# Patient Record
Sex: MALE | Race: WHITE | NOT HISPANIC OR LATINO
[De-identification: names, ages, dates, MRNs, and addresses within clinical notes are randomized per-mention and may not be internally consistent; named-entity substitution may affect disease eponyms.]

---

## 2017-01-05 ENCOUNTER — APPOINTMENT (OUTPATIENT)
Dept: GASTROENTEROLOGY | Facility: CLINIC | Age: 82
End: 2017-01-05

## 2017-01-05 VITALS
TEMPERATURE: 98.1 F | RESPIRATION RATE: 16 BRPM | DIASTOLIC BLOOD PRESSURE: 80 MMHG | HEART RATE: 73 BPM | WEIGHT: 156 LBS | BODY MASS INDEX: 24.48 KG/M2 | OXYGEN SATURATION: 95 % | HEIGHT: 67 IN | SYSTOLIC BLOOD PRESSURE: 138 MMHG

## 2017-01-05 DIAGNOSIS — Z86.39 PERSONAL HISTORY OF OTHER ENDOCRINE, NUTRITIONAL AND METABOLIC DISEASE: ICD-10-CM

## 2017-01-05 DIAGNOSIS — Z86.79 PERSONAL HISTORY OF OTHER DISEASES OF THE CIRCULATORY SYSTEM: ICD-10-CM

## 2017-01-08 PROBLEM — Z86.79 HISTORY OF HYPERTENSION: Status: RESOLVED | Noted: 2017-01-05 | Resolved: 2017-01-08

## 2017-01-08 PROBLEM — Z86.39 HISTORY OF HYPERCHOLESTEROLEMIA: Status: RESOLVED | Noted: 2017-01-05 | Resolved: 2017-01-08

## 2017-01-08 RX ORDER — ROSUVASTATIN CALCIUM 10 MG/1
10 TABLET, FILM COATED ORAL
Refills: 0 | Status: ACTIVE | COMMUNITY

## 2017-01-08 RX ORDER — MULTIVITAMIN
TABLET ORAL
Refills: 0 | Status: ACTIVE | COMMUNITY

## 2018-04-09 ENCOUNTER — APPOINTMENT (OUTPATIENT)
Dept: PULMONOLOGY | Facility: CLINIC | Age: 83
End: 2018-04-09
Payer: MEDICARE

## 2018-04-09 ENCOUNTER — LABORATORY RESULT (OUTPATIENT)
Age: 83
End: 2018-04-09

## 2018-04-09 VITALS
HEART RATE: 98 BPM | RESPIRATION RATE: 17 BRPM | SYSTOLIC BLOOD PRESSURE: 140 MMHG | DIASTOLIC BLOOD PRESSURE: 80 MMHG | OXYGEN SATURATION: 96 % | TEMPERATURE: 98.9 F

## 2018-04-09 PROCEDURE — 99204 OFFICE O/P NEW MOD 45 MIN: CPT

## 2018-04-13 LAB
ACE BLD-CCNC: 40 U/L
ADJUSTED MITOGEN: 0.02 IU/ML
ADJUSTED TB AG: 0 IU/ML
ANA SER IF-ACNC: NEGATIVE
BASOPHILS # BLD AUTO: 0.01 K/UL
BASOPHILS NFR BLD AUTO: 0.2 %
CRP SERPL-MCNC: 0.8 MG/DL
EOSINOPHIL # BLD AUTO: 0.06 K/UL
EOSINOPHIL NFR BLD AUTO: 1 %
ERYTHROCYTE [SEDIMENTATION RATE] IN BLOOD BY WESTERGREN METHOD: 27 MM/HR
HCT VFR BLD CALC: 37.8 %
HGB BLD-MCNC: 13 G/DL
HP PNL SER: NORMAL
IMM GRANULOCYTES NFR BLD AUTO: 0.5 %
LYMPHOCYTES # BLD AUTO: 1.77 K/UL
LYMPHOCYTES NFR BLD AUTO: 29.9 %
M TB IFN-G BLD-IMP: ABNORMAL
MAN DIFF?: NORMAL
MCHC RBC-ENTMCNC: 30.2 PG
MCHC RBC-ENTMCNC: 34.4 GM/DL
MCV RBC AUTO: 87.7 FL
MONOCYTES # BLD AUTO: 1.5 K/UL
MONOCYTES NFR BLD AUTO: 25.3 %
NEUTROPHILS # BLD AUTO: 2.55 K/UL
NEUTROPHILS NFR BLD AUTO: 43.1 %
PLATELET # BLD AUTO: 110 K/UL
QUANTIFERON GOLD NIL: 0.01 IU/ML
RBC # BLD: 4.31 M/UL
RBC # FLD: 13.7 %
RHEUMATOID FACT SER QL: 9 IU/ML
WBC # FLD AUTO: 5.92 K/UL

## 2018-05-22 ENCOUNTER — APPOINTMENT (OUTPATIENT)
Dept: PULMONOLOGY | Facility: CLINIC | Age: 83
End: 2018-05-22
Payer: MEDICARE

## 2018-05-22 VITALS — WEIGHT: 161.5 LBS | BODY MASS INDEX: 25.96 KG/M2 | HEIGHT: 66 IN

## 2018-05-22 PROCEDURE — 94010 BREATHING CAPACITY TEST: CPT

## 2018-05-22 PROCEDURE — 94729 DIFFUSING CAPACITY: CPT

## 2018-05-22 PROCEDURE — 94727 GAS DIL/WSHOT DETER LNG VOL: CPT

## 2018-05-22 PROCEDURE — 85018 HEMOGLOBIN: CPT | Mod: QW

## 2018-06-11 ENCOUNTER — CHART COPY (OUTPATIENT)
Age: 83
End: 2018-06-11

## 2018-06-11 ENCOUNTER — APPOINTMENT (OUTPATIENT)
Dept: PULMONOLOGY | Facility: CLINIC | Age: 83
End: 2018-06-11
Payer: MEDICARE

## 2018-06-11 VITALS
TEMPERATURE: 98.9 F | SYSTOLIC BLOOD PRESSURE: 120 MMHG | RESPIRATION RATE: 17 BRPM | HEART RATE: 72 BPM | DIASTOLIC BLOOD PRESSURE: 70 MMHG | OXYGEN SATURATION: 97 %

## 2018-06-11 PROCEDURE — 99214 OFFICE O/P EST MOD 30 MIN: CPT

## 2018-10-03 ENCOUNTER — APPOINTMENT (OUTPATIENT)
Dept: PULMONOLOGY | Facility: CLINIC | Age: 83
End: 2018-10-03
Payer: MEDICARE

## 2018-10-03 ENCOUNTER — LABORATORY RESULT (OUTPATIENT)
Age: 83
End: 2018-10-03

## 2018-10-03 VITALS
BODY MASS INDEX: 25.9 KG/M2 | TEMPERATURE: 98 F | HEART RATE: 75 BPM | OXYGEN SATURATION: 97 % | RESPIRATION RATE: 18 BRPM | DIASTOLIC BLOOD PRESSURE: 66 MMHG | SYSTOLIC BLOOD PRESSURE: 114 MMHG | HEIGHT: 67 IN | WEIGHT: 165 LBS

## 2018-10-03 DIAGNOSIS — J18.9 PNEUMONIA, UNSPECIFIED ORGANISM: ICD-10-CM

## 2018-10-03 PROCEDURE — 99215 OFFICE O/P EST HI 40 MIN: CPT

## 2018-10-05 LAB
ANA SER IF-ACNC: NEGATIVE
CEA SERPL-MCNC: 1.4 NG/ML
CRP SERPL-MCNC: 0.73 MG/DL
ENA JO1 AB SER IA-ACNC: <0.2 AL
ENA SCL70 IGG SER IA-ACNC: <0.2 AL
ENA SS-A AB SER IA-ACNC: 0.2 AL
ENA SS-B AB SER IA-ACNC: <0.2 AL
ERYTHROCYTE [SEDIMENTATION RATE] IN BLOOD BY WESTERGREN METHOD: 11 MM/HR
M TB IFN-G BLD-IMP: ABNORMAL
MPO AB + PR3 PNL SER: NORMAL
QUANTIFERON TB PLUS MITOGEN MINUS NIL: -0.01 IU/ML
QUANTIFERON TB PLUS NIL: 0.06 IU/ML
QUANTIFERON TB PLUS TB1 MINUS NIL: 0 IU/ML
QUANTIFERON TB PLUS TB2 MINUS NIL: 0.01 IU/ML
SMOOTH MUSCLE AB SER QL IF: NORMAL

## 2018-10-08 LAB
ALDOLASE SERPL-CCNC: 8.8 U/L
CK BB SERPL ELPH-CCNC: 0 % (ref 0–?)
CK MB CFR SERPL ELPH: 0 %
CK MM SERPL ELPH-CCNC: 94 %
CREATINE KINASE,TOTAL,SERUM: 113 U/L
HP PNL SER: NORMAL
MACRO TYPE 1: 6 %
MACRO TYPE 2: 0 %

## 2018-12-05 ENCOUNTER — APPOINTMENT (OUTPATIENT)
Dept: PULMONOLOGY | Facility: CLINIC | Age: 83
End: 2018-12-05
Payer: MEDICARE

## 2018-12-05 VITALS
RESPIRATION RATE: 18 BRPM | OXYGEN SATURATION: 96 % | DIASTOLIC BLOOD PRESSURE: 60 MMHG | SYSTOLIC BLOOD PRESSURE: 120 MMHG | HEART RATE: 76 BPM

## 2018-12-05 PROCEDURE — 99214 OFFICE O/P EST MOD 30 MIN: CPT

## 2018-12-17 ENCOUNTER — RESULT REVIEW (OUTPATIENT)
Age: 83
End: 2018-12-17

## 2019-01-23 ENCOUNTER — APPOINTMENT (OUTPATIENT)
Dept: PULMONOLOGY | Facility: CLINIC | Age: 84
End: 2019-01-23
Payer: MEDICARE

## 2019-01-23 VITALS
OXYGEN SATURATION: 96 % | DIASTOLIC BLOOD PRESSURE: 70 MMHG | SYSTOLIC BLOOD PRESSURE: 125 MMHG | RESPIRATION RATE: 18 BRPM | HEART RATE: 76 BPM

## 2019-01-23 PROCEDURE — 99215 OFFICE O/P EST HI 40 MIN: CPT

## 2019-01-23 NOTE — CONSULT LETTER
[Dear  ___] : Dear  [unfilled], [FreeTextEntry1] : I had the pleasure of evaluating your patient, MARTIN PEREZ , in the office today.  Please review my consultation and evaluation report that follows below.  Please do not hesitate to call me if further information is necessary or if you wish to discuss ongoing care or diagnostic work-up.   \par I very much appreciate your referral and it is a privilege to be able to provide care for your patient.\par \par Sincerely,\par  \par Jordi Prado MD, MHCM, FACP\par Pulmonary Medicine\par  of Medicine\par [employer] at [facility]\par \par [email or web address]\par Multi-Specialties at [place]\par \par

## 2019-01-23 NOTE — ASSESSMENT
[FreeTextEntry1] : Very pleasant 83-year-old gentleman who is followed here for pulmonary nodules and interstitial findings on his CAT scan\par His pulmonary status is stable. There is some improvement in the groundglass appearance in the upper lobes. He has persistent groundglass appearance and some interstitial scarring at both lower lungs right greater than left\par He does not however have any symptoms or cough or sputum or shortness of breath\par \par In the interim the patient has diagnosis of chronic myelomonocytic leukemia and is being referred for evaluation by Dr. Ford at the [facility] [place]\par \par Extensive discussion with the patient was had regarding possible treatment course and I referred him to hematology oncology experts for specific recommendations\par \par I do not recommend any specific intervention for his pulmonary status at this time just as before and I addressed the patient return here in 6 months time for an interim reevaluation

## 2019-01-23 NOTE — HISTORY OF PRESENT ILLNESS
[FreeTextEntry1] : The patient is an 83-year-old gentleman with interstitial lung disease\par \par A repeat CT of the chest demonstrated improvement in ground glass infiltrates in upper lobes but a somewhat deepening of scarring in both lower lobes compared to earlier films\par \par In any event he has no pulmonary complaints or shortness of breath\par \par In the interim the patient does have a diagnosis of chronic myelomonocytic leukemia and is being referred for evaluation by Dr. Ford

## 2019-02-01 ENCOUNTER — OUTPATIENT (OUTPATIENT)
Dept: OUTPATIENT SERVICES | Facility: HOSPITAL | Age: 84
LOS: 1 days | Discharge: HOME | End: 2019-02-01

## 2019-02-01 DIAGNOSIS — D45 POLYCYTHEMIA VERA: ICD-10-CM

## 2019-02-12 ENCOUNTER — APPOINTMENT (OUTPATIENT)
Dept: HEMATOLOGY ONCOLOGY | Facility: CLINIC | Age: 84
End: 2019-02-12
Payer: MEDICARE

## 2019-02-12 ENCOUNTER — RESULT REVIEW (OUTPATIENT)
Age: 84
End: 2019-02-12

## 2019-02-12 ENCOUNTER — OUTPATIENT (OUTPATIENT)
Dept: OUTPATIENT SERVICES | Facility: HOSPITAL | Age: 84
LOS: 1 days | End: 2019-02-12
Payer: COMMERCIAL

## 2019-02-12 VITALS
OXYGEN SATURATION: 95 % | RESPIRATION RATE: 17 BRPM | SYSTOLIC BLOOD PRESSURE: 172 MMHG | WEIGHT: 167.55 LBS | BODY MASS INDEX: 26.24 KG/M2 | TEMPERATURE: 97.6 F | HEART RATE: 79 BPM | DIASTOLIC BLOOD PRESSURE: 87 MMHG

## 2019-02-12 DIAGNOSIS — F17.290 NICOTINE DEPENDENCE, OTHER TOBACCO PRODUCT, UNCOMPLICATED: ICD-10-CM

## 2019-02-12 DIAGNOSIS — D69.6 THROMBOCYTOPENIA, UNSPECIFIED: ICD-10-CM

## 2019-02-12 DIAGNOSIS — C93.10 CHRONIC MYELOMONOCYTIC LEUKEMIA NOT HAVING ACHIEVED REMISSION: ICD-10-CM

## 2019-02-12 DIAGNOSIS — Z87.891 PERSONAL HISTORY OF NICOTINE DEPENDENCE: ICD-10-CM

## 2019-02-12 DIAGNOSIS — K86.2 CYST OF PANCREAS: ICD-10-CM

## 2019-02-12 DIAGNOSIS — Z78.9 OTHER SPECIFIED HEALTH STATUS: ICD-10-CM

## 2019-02-12 DIAGNOSIS — E78.5 HYPERLIPIDEMIA, UNSPECIFIED: ICD-10-CM

## 2019-02-12 LAB
BASOPHILS # BLD AUTO: 0 K/UL — SIGNIFICANT CHANGE UP (ref 0–0.2)
BASOPHILS NFR BLD AUTO: 0.8 % — SIGNIFICANT CHANGE UP (ref 0–2)
EOSINOPHIL # BLD AUTO: 0 K/UL — SIGNIFICANT CHANGE UP (ref 0–0.5)
EOSINOPHIL NFR BLD AUTO: 0 % — SIGNIFICANT CHANGE UP (ref 0–6)
HCT VFR BLD CALC: 37.9 % — LOW (ref 39–50)
HGB BLD-MCNC: 12.8 G/DL — LOW (ref 13–17)
LYMPHOCYTES # BLD AUTO: 2.4 K/UL — SIGNIFICANT CHANGE UP (ref 1–3.3)
LYMPHOCYTES # BLD AUTO: 40 % — SIGNIFICANT CHANGE UP (ref 13–44)
MCHC RBC-ENTMCNC: 29.5 PG — SIGNIFICANT CHANGE UP (ref 27–34)
MCHC RBC-ENTMCNC: 33.8 G/DL — SIGNIFICANT CHANGE UP (ref 32–36)
MCV RBC AUTO: 87.2 FL — SIGNIFICANT CHANGE UP (ref 80–100)
MONOCYTES # BLD AUTO: 1.5 K/UL — HIGH (ref 0–0.9)
MONOCYTES NFR BLD AUTO: 25.4 % — HIGH (ref 2–14)
NEUTROPHILS # BLD AUTO: 2 K/UL — SIGNIFICANT CHANGE UP (ref 1.8–7.4)
NEUTROPHILS NFR BLD AUTO: 33.8 % — LOW (ref 43–77)
PLATELET # BLD AUTO: 90 K/UL — LOW (ref 150–400)
RBC # BLD: 4.35 M/UL — SIGNIFICANT CHANGE UP (ref 4.2–5.8)
RBC # FLD: 12.7 % — SIGNIFICANT CHANGE UP (ref 10.3–14.5)
RETICS #: 69.2 K/UL — SIGNIFICANT CHANGE UP (ref 25–125)
RETICS/RBC NFR: 1.6 % — SIGNIFICANT CHANGE UP (ref 0.5–2.5)
WBC # BLD: 5.9 K/UL — SIGNIFICANT CHANGE UP (ref 3.8–10.5)
WBC # FLD AUTO: 5.9 K/UL — SIGNIFICANT CHANGE UP (ref 3.8–10.5)

## 2019-02-12 PROCEDURE — 88184 FLOWCYTOMETRY/ TC 1 MARKER: CPT

## 2019-02-12 PROCEDURE — 87205 SMEAR GRAM STAIN: CPT

## 2019-02-12 PROCEDURE — 81270 JAK2 GENE: CPT

## 2019-02-12 PROCEDURE — 99205 OFFICE O/P NEW HI 60 MIN: CPT

## 2019-02-12 PROCEDURE — 88185 FLOWCYTOMETRY/TC ADD-ON: CPT

## 2019-02-12 PROCEDURE — 88189 FLOWCYTOMETRY/READ 16 & >: CPT

## 2019-02-12 PROCEDURE — G0452: CPT | Mod: 26

## 2019-02-13 LAB — TM INTERPRETATION: SIGNIFICANT CHANGE UP

## 2019-02-14 ENCOUNTER — TRANSCRIPTION ENCOUNTER (OUTPATIENT)
Age: 84
End: 2019-02-14

## 2019-02-15 LAB — JAK2 P.V617F BLD/T QL: SIGNIFICANT CHANGE UP

## 2019-02-23 LAB
ALBUMIN MFR SERPL ELPH: 57.8 %
ALBUMIN SERPL ELPH-MCNC: 4.5 G/DL
ALBUMIN SERPL-MCNC: 4.2 G/DL
ALBUMIN/GLOB SERPL: 1.4 RATIO
ALP BLD-CCNC: 99 U/L
ALPHA1 GLOB MFR SERPL ELPH: 4.3 %
ALPHA1 GLOB SERPL ELPH-MCNC: 0.3 G/DL
ALPHA2 GLOB MFR SERPL ELPH: 9.8 %
ALPHA2 GLOB SERPL ELPH-MCNC: 0.7 G/DL
ALT SERPL-CCNC: 24 U/L
ANION GAP SERPL CALC-SCNC: 12 MMOL/L
AST SERPL-CCNC: 24 U/L
B-GLOBULIN MFR SERPL ELPH: 11.3 %
B-GLOBULIN SERPL ELPH-MCNC: 0.8 G/DL
BILIRUB SERPL-MCNC: 0.5 MG/DL
BUN SERPL-MCNC: 19 MG/DL
CALCIUM SERPL-MCNC: 9.1 MG/DL
CHLORIDE SERPL-SCNC: 107 MMOL/L
CO2 SERPL-SCNC: 26 MMOL/L
CREAT SERPL-MCNC: 1.1 MG/DL
DEPRECATED KAPPA LC FREE/LAMBDA SER: 1.04 RATIO
DEPRECATED KAPPA LC FREE/LAMBDA SER: 1.04 RATIO
EPO SERPL-MCNC: 19.4 MIU/ML
FERRITIN SERPL-MCNC: 119 NG/ML
GAMMA GLOB FLD ELPH-MCNC: 1.2 G/DL
GAMMA GLOB MFR SERPL ELPH: 16.8 %
GLUCOSE SERPL-MCNC: 103 MG/DL
HAPTOGLOB SERPL-MCNC: 121 MG/DL
HBV CORE IGG+IGM SER QL: NONREACTIVE
HBV SURFACE AB SER QL: REACTIVE
HBV SURFACE AG SER QL: NONREACTIVE
HCV AB SER QL: NONREACTIVE
HCV S/CO RATIO: 0.1 S/CO
HUMAN IMMUNODEFICIENCY VIRUS 1 (HIV-1) QUALITATIVE, RNA: NEGATIVE
IGA SER QL IEP: 158 MG/DL
IGG SER QL IEP: 1469 MG/DL
IGM SER QL IEP: 117 MG/DL
INTERPRETATION SERPL IEP-IMP: NORMAL
IRON SATN MFR SERPL: 22 %
IRON SERPL-MCNC: 85 UG/DL
KAPPA LC CSF-MCNC: 1.7 MG/DL
KAPPA LC CSF-MCNC: 1.7 MG/DL
KAPPA LC SERPL-MCNC: 1.77 MG/DL
KAPPA LC SERPL-MCNC: 1.77 MG/DL
LDH SERPL-CCNC: 237 U/L
POTASSIUM SERPL-SCNC: 4.2 MMOL/L
PROT SERPL-MCNC: 7.3 G/DL
SODIUM SERPL-SCNC: 145 MMOL/L
T(9;22)(ABL1,BCR)/CONTROL BLD/T: NORMAL
TIBC SERPL-MCNC: 385 UG/DL
TSH SERPL-ACNC: 1.86 UIU/ML
UIBC SERPL-MCNC: 300 UG/DL
URATE SERPL-MCNC: 6.3 MG/DL
VIT B12 SERPL-MCNC: 575 PG/ML

## 2019-02-24 PROBLEM — Z87.891 FORMER SMOKER: Status: ACTIVE | Noted: 2017-01-05

## 2019-02-24 PROBLEM — E78.5 HYPERLIPIDEMIA, ACQUIRED: Status: ACTIVE | Noted: 2019-02-24

## 2019-02-24 PROBLEM — K86.2 PANCREAS CYST: Status: ACTIVE | Noted: 2017-01-08

## 2019-02-24 PROBLEM — D69.6 THROMBOCYTOPENIA, ACQUIRED: Status: ACTIVE | Noted: 2019-02-24

## 2019-02-24 PROBLEM — Z78.9 ALCOHOL USE: Status: ACTIVE | Noted: 2017-01-05

## 2019-02-24 PROBLEM — F17.290 CIGAR SMOKER: Status: ACTIVE | Noted: 2017-01-05

## 2019-02-24 NOTE — CONSULT LETTER
[Dear  ___] : Dear  [unfilled], [Consult Letter:] : I had the pleasure of evaluating your patient, [unfilled]. [Please see my note below.] : Please see my note below. [Consult Closing:] : Thank you very much for allowing me to participate in the care of this patient.  If you have any questions, please do not hesitate to contact me. [Sincerely,] : Sincerely, [FreeTextEntry2] : Luis A Sánchez M.D.\par [facility]\par [place] [id number] [FreeTextEntry3] : Seymour Ford M.D., [facility]\par  [DrHugh  ___] : Dr. BOWDEN

## 2019-02-24 NOTE — HISTORY OF PRESENT ILLNESS
[de-identified] : Mr. Clemens is an 83 year old man who comes for an opinion regarding management of CMMoL.  He has had mild anemia, thrombocytopenia and a sustained absolute monocytosis since 2016.  In 2014, he had a negative colonosopy and upper endoscopy and a capsule enteroscopy was recommended, so mild anemia probably has been present since prior to 2016.  He has been evaluated by Dr. Luis A Sánchez; bone marrow aspirate and biopsy showed a hypercellular marrow with increased megakaryocytes with mild dysplastic features and abnormal monocytes; by flow the monocytes showed an abnormal maturation pattern with \par heterogeneous CD13 expression, increased CD14/CD16 ratio and partial, dim CD2.  The was no increase in myeloid blasts or abnormal granularity; myeloid antigen expression pattern was normal.  The lymphoid immunophenotype was normal.  Cytogenetics was normal.  Onkomyelo analysis showed RUNX1 splice mutation,s, an SRSF2 mutation and two TET2 mutations.  \par An unexplained minimal positive result for BCR-ABL rearrangement by PCR was seen; this was re;eated today and was (-) so the first result may have been a false (+).  The NextGen analysis showed no abnormalities in abl.\par Since 8/2017, the Hgb has been stable in the 12 to 12.5 range, platelets 100-120K, ANC generally in the 1.5 to 2.0 range and with two exceptions the abs monocyte counts has been 1.5 or  higher.  There has been no trend to worsening hematologic parameters, bleeding episodes, neurologic complaints, GI complaints or infections. He has had no transfusions, extramedullary manifestations of disease, particularly skin lesions.  He has had no bone pain.\par He has a h/o interstitial lung disease followed by Dr. Jordi Prado.  His most recent CT of chest showed improvement in GGOs and increased lower lobe scarring.  He has JERONIMO which is not severe.  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  He has been seen by Dr. Dennis Swain and follow-up with MRI was recommended.  He has no GI c/o.\par He has a h/o HTN and hyperlipidemia.\par   [de-identified] : initial visit.

## 2019-02-24 NOTE — REVIEW OF SYSTEMS
[Patient Intake Form Reviewed] : Patient intake form was reviewed [Negative] : Allergic/Immunologic [Confused] : no confusion [Dizziness] : no dizziness [Fainting] : no fainting [FreeTextEntry6] : mild JERONIMO [de-identified] : past h/o cerebellar AVM

## 2019-03-04 ENCOUNTER — TRANSCRIPTION ENCOUNTER (OUTPATIENT)
Age: 84
End: 2019-03-04

## 2019-04-10 ENCOUNTER — APPOINTMENT (OUTPATIENT)
Dept: PULMONOLOGY | Facility: CLINIC | Age: 84
End: 2019-04-10
Payer: MEDICARE

## 2019-04-10 VITALS
OXYGEN SATURATION: 98 % | HEART RATE: 72 BPM | RESPIRATION RATE: 17 BRPM | WEIGHT: 167 LBS | BODY MASS INDEX: 26.21 KG/M2 | SYSTOLIC BLOOD PRESSURE: 110 MMHG | HEIGHT: 67 IN | DIASTOLIC BLOOD PRESSURE: 70 MMHG

## 2019-04-10 DIAGNOSIS — I10 ESSENTIAL (PRIMARY) HYPERTENSION: ICD-10-CM

## 2019-04-10 PROCEDURE — 99214 OFFICE O/P EST MOD 30 MIN: CPT

## 2019-04-10 NOTE — HISTORY OF PRESENT ILLNESS
[FreeTextEntry1] : \par Very pleasant 83-year-old gentleman who is followed here for pulmonary nodules and interstitial findings on his CAT scan\par His pulmonary status is stable. There is some improvement in the ground glass appearance in the upper lobes. He has persistent groundglass appearance and some interstitial scarring at both lower lungs right greater than left\par He does not however have any symptoms or cough or sputum or shortness of breath\par \par The patient has been doing very well\par Recent evaluation by Dr.K collins and the patient appears to have CML.\par No current medication is recommended at this time\par \par The patient's pulmonary status is stable\par He has no shortness of breath no cough no sputum

## 2019-04-10 NOTE — ASSESSMENT
[FreeTextEntry1] : The patient is a an 84-year-old gentleman with interstitial lung disease but no symptoms\par He also has coincident CML\par \par From a pulmonary standpoint, I am recommending observation as well. He appears to have interstitial disease of uncertain etiology but this is stable . In the absence of symptoms I would recommend a followup CT in one year. I would like to see the patient again in about 4 months for an interim reevaluation\par \par

## 2019-04-10 NOTE — CONSULT LETTER
[FreeTextEntry1] : I had the pleasure of evaluating your patient, MARTIN PEREZ , in the office today.  Please review my consultation and evaluation report that follows below.  Please do not hesitate to call me if further information is necessary or if you wish to discuss ongoing care or diagnostic work-up.   \par I very much appreciate your referral and it is a privilege to be able to provide care for your patient.\par \par Sincerely,\par  \par Jordi Prado MD, MHCM, FACP\par Pulmonary Medicine\par  of Medicine\par [employer] at [facility]\par \par [email or web address]\par Multi-Specialties at [place]\par \par  [Dear  ___] : Dear  [unfilled],

## 2019-04-10 NOTE — PHYSICAL EXAM
[General Appearance - Well Developed] : well developed [Well Groomed] : well groomed [Normal Appearance] : normal appearance [General Appearance - Well Nourished] : well nourished [General Appearance - In No Acute Distress] : no acute distress [No Deformities] : no deformities [Eyelids - No Xanthelasma] : the eyelids demonstrated no xanthelasmas [Normal Oropharynx] : normal oropharynx [Normal Conjunctiva] : the conjunctiva exhibited no abnormalities [Neck Appearance] : the appearance of the neck was normal [Neck Cervical Mass (___cm)] : no neck mass was observed [Jugular Venous Distention Increased] : there was no jugular-venous distention [Thyroid Diffuse Enlargement] : the thyroid was not enlarged [Heart Sounds] : normal S1 and S2 [Thyroid Nodule] : there were no palpable thyroid nodules [Heart Rate And Rhythm] : heart rate and rhythm were normal [Murmurs] : no murmurs present [Respiration, Rhythm And Depth] : normal respiratory rhythm and effort [Auscultation Breath Sounds / Voice Sounds] : lungs were clear to auscultation bilaterally [Exaggerated Use Of Accessory Muscles For Inspiration] : no accessory muscle use [Abdomen Soft] : soft [Abdomen Tenderness] : non-tender [Abnormal Walk] : normal gait [Abdomen Mass (___ Cm)] : no abdominal mass palpated [Gait - Sufficient For Exercise Testing] : the gait was sufficient for exercise testing [Nail Clubbing] : no clubbing of the fingernails [Cyanosis, Localized] : no localized cyanosis [Petechial Hemorrhages (___cm)] : no petechial hemorrhages [Skin Color & Pigmentation] : normal skin color and pigmentation [No Venous Stasis] : no venous stasis [] : no rash [Skin Lesions] : no skin lesions [No Skin Ulcers] : no skin ulcer [No Xanthoma] : no  xanthoma was observed [Deep Tendon Reflexes (DTR)] : deep tendon reflexes were 2+ and symmetric [Sensation] : the sensory exam was normal to light touch and pinprick [No Focal Deficits] : no focal deficits [Oriented To Time, Place, And Person] : oriented to person, place, and time [Impaired Insight] : insight and judgment were intact [Affect] : the affect was normal

## 2019-08-14 ENCOUNTER — APPOINTMENT (OUTPATIENT)
Dept: PULMONOLOGY | Facility: CLINIC | Age: 84
End: 2019-08-14

## 2019-08-14 ENCOUNTER — APPOINTMENT (OUTPATIENT)
Dept: PULMONOLOGY | Facility: CLINIC | Age: 84
End: 2019-08-14
Payer: MEDICARE

## 2019-08-14 VITALS
HEART RATE: 71 BPM | TEMPERATURE: 97.7 F | DIASTOLIC BLOOD PRESSURE: 78 MMHG | WEIGHT: 158 LBS | SYSTOLIC BLOOD PRESSURE: 138 MMHG | OXYGEN SATURATION: 96 % | BODY MASS INDEX: 24.75 KG/M2

## 2019-08-14 VITALS — BODY MASS INDEX: 25.12 KG/M2 | HEIGHT: 66.5 IN

## 2019-08-14 DIAGNOSIS — C93.10 CHRONIC MYELOMONOCYTIC LEUKEMIA NOT HAVING ACHIEVED REMISSION: ICD-10-CM

## 2019-08-14 PROCEDURE — 99215 OFFICE O/P EST HI 40 MIN: CPT | Mod: 25

## 2019-08-14 PROCEDURE — 94010 BREATHING CAPACITY TEST: CPT

## 2019-08-14 PROCEDURE — 94729 DIFFUSING CAPACITY: CPT

## 2019-08-14 PROCEDURE — 94727 GAS DIL/WSHOT DETER LNG VOL: CPT

## 2019-08-14 NOTE — PROCEDURE
[FreeTextEntry1] : Pulmonary functions were obtained today\par There is no evidence of obstruction\par His diffusion capacity is normal when adjusted for lung volume\par Total lung capacity is 80% which is normal and this represents 4.38 L\par \par TLC decreased from 4.77 L obtained in May of 2018 but I'm not sure that this is a clinically significant change in that calculated total lung capacity went up\par \par

## 2019-08-14 NOTE — PHYSICAL EXAM
[Normal Appearance] : normal appearance [General Appearance - Well Developed] : well developed [General Appearance - Well Nourished] : well nourished [Well Groomed] : well groomed [No Deformities] : no deformities [General Appearance - In No Acute Distress] : no acute distress [Normal Conjunctiva] : the conjunctiva exhibited no abnormalities [Eyelids - No Xanthelasma] : the eyelids demonstrated no xanthelasmas [Normal Oropharynx] : normal oropharynx [Neck Appearance] : the appearance of the neck was normal [Neck Cervical Mass (___cm)] : no neck mass was observed [Thyroid Diffuse Enlargement] : the thyroid was not enlarged [Jugular Venous Distention Increased] : there was no jugular-venous distention [Thyroid Nodule] : there were no palpable thyroid nodules [Heart Rate And Rhythm] : heart rate and rhythm were normal [Heart Sounds] : normal S1 and S2 [Murmurs] : no murmurs present [Exaggerated Use Of Accessory Muscles For Inspiration] : no accessory muscle use [Respiration, Rhythm And Depth] : normal respiratory rhythm and effort [Auscultation Breath Sounds / Voice Sounds] : lungs were clear to auscultation bilaterally [Abdomen Tenderness] : non-tender [Abdomen Soft] : soft [Abdomen Mass (___ Cm)] : no abdominal mass palpated [Abnormal Walk] : normal gait [Gait - Sufficient For Exercise Testing] : the gait was sufficient for exercise testing [Nail Clubbing] : no clubbing of the fingernails [Cyanosis, Localized] : no localized cyanosis [Petechial Hemorrhages (___cm)] : no petechial hemorrhages [Skin Color & Pigmentation] : normal skin color and pigmentation [] : no rash [No Venous Stasis] : no venous stasis [No Skin Ulcers] : no skin ulcer [Skin Lesions] : no skin lesions [No Xanthoma] : no  xanthoma was observed [Deep Tendon Reflexes (DTR)] : deep tendon reflexes were 2+ and symmetric [Sensation] : the sensory exam was normal to light touch and pinprick [No Focal Deficits] : no focal deficits [Oriented To Time, Place, And Person] : oriented to person, place, and time [Impaired Insight] : insight and judgment were intact [Affect] : the affect was normal

## 2019-08-14 NOTE — HISTORY OF PRESENT ILLNESS
[FreeTextEntry1] : The patient is a very pleasant 84-year-old gentleman with interstitial lung disease of undetermined etiology\par He has had no pulmonary symptoms prior and he has none now\par \par The patient also has CMMoL which is being followed by hematology\par At the current time there has been no indication for treatment\par \par The patient takes medications for his high blood pressure and has high cholesterol

## 2019-08-14 NOTE — CONSULT LETTER
[Dear  ___] : Dear  [unfilled], [DrHugh  ___] : Dr. BOWDEN [FreeTextEntry1] : I had the pleasure of evaluating your patient, MARTIN PEREZ , in the office today.  Please review my consultation and evaluation report that follows below.  Please do not hesitate to call me if further information is necessary or if you wish to discuss ongoing care or diagnostic work-up.   \par I very much appreciate your referral and it is a privilege to be able to provide care for your patient.\par \par Sincerely,\par  \par Jordi Prado MD, MHCM, FACP\par Pulmonary Medicine\par  of Medicine\par [employer] at [facility]\par \par [email or web address]\par Multi-Specialties at [place]\par \par

## 2019-08-14 NOTE — ASSESSMENT
[FreeTextEntry1] : The patient is a very pleasant 84-year-old gentleman who has been followed here for groundglass appearance in his CAT scan and interstitial lung disease\par \par He has no pulmonary symptoms whatsoever\par His pulmonary function is essentially unchanged\par \par I am not recommending any intervention at this time from a pulmonary standpoint\par He will require a repeat CT in January of 2020\par \par He continues to be followed for CMMoL and at present he does not appear to require treatment or intervention

## 2020-01-14 ENCOUNTER — APPOINTMENT (OUTPATIENT)
Dept: PULMONOLOGY | Facility: CLINIC | Age: 85
End: 2020-01-14
Payer: MEDICARE

## 2020-01-14 VITALS
WEIGHT: 160 LBS | HEIGHT: 67 IN | HEART RATE: 88 BPM | TEMPERATURE: 97.8 F | SYSTOLIC BLOOD PRESSURE: 149 MMHG | OXYGEN SATURATION: 96 % | BODY MASS INDEX: 25.11 KG/M2 | DIASTOLIC BLOOD PRESSURE: 77 MMHG

## 2020-01-14 DIAGNOSIS — J84.9 INTERSTITIAL PULMONARY DISEASE, UNSPECIFIED: ICD-10-CM

## 2020-01-14 DIAGNOSIS — R91.8 OTHER NONSPECIFIC ABNORMAL FINDING OF LUNG FIELD: ICD-10-CM

## 2020-01-14 PROCEDURE — 99215 OFFICE O/P EST HI 40 MIN: CPT

## 2020-01-14 RX ORDER — LOSARTAN POTASSIUM 50 MG/1
50 TABLET, FILM COATED ORAL
Refills: 0 | Status: DISCONTINUED | COMMUNITY
End: 2020-01-14

## 2020-01-14 NOTE — PHYSICAL EXAM
[General Appearance - Well Developed] : well developed [Normal Appearance] : normal appearance [Well Groomed] : well groomed [No Deformities] : no deformities [General Appearance - Well Nourished] : well nourished [General Appearance - In No Acute Distress] : no acute distress [Normal Conjunctiva] : the conjunctiva exhibited no abnormalities [Normal Oropharynx] : normal oropharynx [Eyelids - No Xanthelasma] : the eyelids demonstrated no xanthelasmas [Neck Cervical Mass (___cm)] : no neck mass was observed [Neck Appearance] : the appearance of the neck was normal [Jugular Venous Distention Increased] : there was no jugular-venous distention [Heart Rate And Rhythm] : heart rate and rhythm were normal [Thyroid Nodule] : there were no palpable thyroid nodules [Thyroid Diffuse Enlargement] : the thyroid was not enlarged [Murmurs] : no murmurs present [Heart Sounds] : normal S1 and S2 [Respiration, Rhythm And Depth] : normal respiratory rhythm and effort [Exaggerated Use Of Accessory Muscles For Inspiration] : no accessory muscle use [Abdomen Soft] : soft [Auscultation Breath Sounds / Voice Sounds] : lungs were clear to auscultation bilaterally [Abdomen Tenderness] : non-tender [Abdomen Mass (___ Cm)] : no abdominal mass palpated [Abnormal Walk] : normal gait [Nail Clubbing] : no clubbing of the fingernails [Gait - Sufficient For Exercise Testing] : the gait was sufficient for exercise testing [Cyanosis, Localized] : no localized cyanosis [Petechial Hemorrhages (___cm)] : no petechial hemorrhages [] : no rash [Skin Color & Pigmentation] : normal skin color and pigmentation [No Venous Stasis] : no venous stasis [Skin Lesions] : no skin lesions [No Skin Ulcers] : no skin ulcer [No Xanthoma] : no  xanthoma was observed [Deep Tendon Reflexes (DTR)] : deep tendon reflexes were 2+ and symmetric [Sensation] : the sensory exam was normal to light touch and pinprick [No Focal Deficits] : no focal deficits [Oriented To Time, Place, And Person] : oriented to person, place, and time [Impaired Insight] : insight and judgment were intact [Affect] : the affect was normal

## 2020-01-14 NOTE — CONSULT LETTER
[FreeTextEntry1] : I had the pleasure of evaluating your patient, MARTIN PEREZ , in the office today.  Please review my consultation and evaluation report that follows below.  Please do not hesitate to call me if further information is necessary or if you wish to discuss ongoing care or diagnostic work-up.   \par I very much appreciate your referral and it is a privilege to be able to provide care for your patient.\par \par Sincerely,\par  \par Jordi Prado MD, MHCM, FACP\par Pulmonary Medicine\par  of Medicine\par [employer] at [facility]\par \par [email or web address]\par Multi-Specialties at [place]\par \par  [Dear  ___] : Dear  [unfilled], [DrHugh  ___] : Dr. BOWDEN

## 2020-01-14 NOTE — ASSESSMENT
[FreeTextEntry1] : The patient is a very pleasant 84-year-old gentleman with interstitial disease and pulmonary nodules\par These have been fairly stable with some resolution of interstitial infiltrates\par The pulmonary nodules not substantially changed\par Originally seen in 2016\par \par I am recommending continued surveillance of his CT in view of the interstitial findings although he has no pulmonary symptoms and pulmonary functions last obtained were within normal limits\par \par I have asked him to repeat a CT in one year's time and return here

## 2020-01-14 NOTE — HISTORY OF PRESENT ILLNESS
[FreeTextEntry1] : 84-year-old gentleman here for repeat visit for evaluation of bilateral interstitial infiltrates\par \par The patient as usual has no pulmonary complaints and is looking well\par He has changed his primary care and hematologist that is following him for CMML\par \par He recently had a chest CAT scan that was reviewed along with the patient today\par There is persistent mild interstitial infiltrate especially at both lung bases but it appears to be minimally improved\par There are pulmonary nodules that are stable in the right lower lobe. Some are likely pulmonary lymph nodes\par Films dating back to 2016 were reviewed and compared